# Patient Record
Sex: FEMALE | Race: OTHER | HISPANIC OR LATINO | Employment: FULL TIME | URBAN - METROPOLITAN AREA
[De-identification: names, ages, dates, MRNs, and addresses within clinical notes are randomized per-mention and may not be internally consistent; named-entity substitution may affect disease eponyms.]

---

## 2022-11-23 ENCOUNTER — APPOINTMENT (OUTPATIENT)
Dept: LAB | Age: 27
End: 2022-11-23

## 2022-11-23 ENCOUNTER — APPOINTMENT (OUTPATIENT)
Dept: URGENT CARE | Age: 27
End: 2022-11-23

## 2022-11-23 DIAGNOSIS — Z02.1 PHYSICAL EXAM, PRE-EMPLOYMENT: Primary | ICD-10-CM

## 2022-11-23 DIAGNOSIS — Z02.1 PHYSICAL EXAM, PRE-EMPLOYMENT: ICD-10-CM

## 2022-11-28 LAB
GAMMA INTERFERON BACKGROUND BLD IA-ACNC: 0.06 IU/ML
M TB IFN-G BLD-IMP: NEGATIVE
M TB IFN-G CD4+ BCKGRND COR BLD-ACNC: 0.02 IU/ML
M TB IFN-G CD4+ BCKGRND COR BLD-ACNC: 0.03 IU/ML
MITOGEN IGNF BCKGRD COR BLD-ACNC: 8.51 IU/ML

## 2023-01-20 ENCOUNTER — TELEPHONE (OUTPATIENT)
Dept: GYNECOLOGY | Facility: CLINIC | Age: 28
End: 2023-01-20

## 2023-01-24 ENCOUNTER — OFFICE VISIT (OUTPATIENT)
Dept: GYNECOLOGY | Facility: CLINIC | Age: 28
End: 2023-01-24

## 2023-01-24 ENCOUNTER — ULTRASOUND (OUTPATIENT)
Dept: GYNECOLOGY | Facility: CLINIC | Age: 28
End: 2023-01-24

## 2023-01-24 ENCOUNTER — APPOINTMENT (OUTPATIENT)
Dept: LAB | Facility: HOSPITAL | Age: 28
End: 2023-01-24

## 2023-01-24 VITALS
DIASTOLIC BLOOD PRESSURE: 80 MMHG | HEIGHT: 67 IN | BODY MASS INDEX: 30.7 KG/M2 | SYSTOLIC BLOOD PRESSURE: 124 MMHG | WEIGHT: 195.6 LBS

## 2023-01-24 DIAGNOSIS — R10.32 LLQ PAIN: Primary | ICD-10-CM

## 2023-01-24 DIAGNOSIS — Z98.891 HISTORY OF CESAREAN SECTION: ICD-10-CM

## 2023-01-24 DIAGNOSIS — Z11.3 SCREENING FOR STD (SEXUALLY TRANSMITTED DISEASE): ICD-10-CM

## 2023-01-24 DIAGNOSIS — N94.10 DYSPAREUNIA, FEMALE: ICD-10-CM

## 2023-01-24 DIAGNOSIS — R10.2 PELVIC PAIN IN FEMALE: ICD-10-CM

## 2023-01-24 DIAGNOSIS — N76.0 BV (BACTERIAL VAGINOSIS): ICD-10-CM

## 2023-01-24 DIAGNOSIS — N94.6 DYSMENORRHEA: ICD-10-CM

## 2023-01-24 DIAGNOSIS — N92.0 MENORRHAGIA WITH REGULAR CYCLE: ICD-10-CM

## 2023-01-24 DIAGNOSIS — R30.0 DYSURIA: ICD-10-CM

## 2023-01-24 DIAGNOSIS — B96.89 BV (BACTERIAL VAGINOSIS): ICD-10-CM

## 2023-01-24 DIAGNOSIS — Z30.011 ENCOUNTER FOR INITIAL PRESCRIPTION OF CONTRACEPTIVE PILLS: ICD-10-CM

## 2023-01-24 DIAGNOSIS — N83.202 LEFT OVARIAN CYST: ICD-10-CM

## 2023-01-24 LAB
B-HCG SERPL-ACNC: <1 MIU/ML (ref 0–11.6)
BACTERIA UR QL AUTO: ABNORMAL /HPF
BASOPHILS # BLD AUTO: 0.04 THOUSANDS/ÂΜL (ref 0–0.1)
BASOPHILS NFR BLD AUTO: 0 % (ref 0–1)
BILIRUB UR QL STRIP: ABNORMAL
CLARITY UR: ABNORMAL
COLOR UR: ABNORMAL
EOSINOPHIL # BLD AUTO: 0.41 THOUSAND/ÂΜL (ref 0–0.61)
EOSINOPHIL NFR BLD AUTO: 4 % (ref 0–6)
ERYTHROCYTE [DISTWIDTH] IN BLOOD BY AUTOMATED COUNT: 13.4 % (ref 11.6–15.1)
GLUCOSE UR STRIP-MCNC: NEGATIVE MG/DL
HBV SURFACE AG SER QL: NORMAL
HCT VFR BLD AUTO: 40.5 % (ref 34.8–46.1)
HCV AB SER QL: NORMAL
HGB BLD-MCNC: 12.9 G/DL (ref 11.5–15.4)
HGB UR QL STRIP.AUTO: ABNORMAL
IMM GRANULOCYTES # BLD AUTO: 0.02 THOUSAND/UL (ref 0–0.2)
IMM GRANULOCYTES NFR BLD AUTO: 0 % (ref 0–2)
KETONES UR STRIP-MCNC: NEGATIVE MG/DL
LEUKOCYTE ESTERASE UR QL STRIP: ABNORMAL
LYMPHOCYTES # BLD AUTO: 2.45 THOUSANDS/ÂΜL (ref 0.6–4.47)
LYMPHOCYTES NFR BLD AUTO: 23 % (ref 14–44)
MCH RBC QN AUTO: 26.6 PG (ref 26.8–34.3)
MCHC RBC AUTO-ENTMCNC: 31.9 G/DL (ref 31.4–37.4)
MCV RBC AUTO: 84 FL (ref 82–98)
MONOCYTES # BLD AUTO: 0.75 THOUSAND/ÂΜL (ref 0.17–1.22)
MONOCYTES NFR BLD AUTO: 7 % (ref 4–12)
MUCOUS THREADS UR QL AUTO: ABNORMAL
NEUTROPHILS # BLD AUTO: 7.1 THOUSANDS/ÂΜL (ref 1.85–7.62)
NEUTS SEG NFR BLD AUTO: 66 % (ref 43–75)
NITRITE UR QL STRIP: POSITIVE
NON-SQ EPI CELLS URNS QL MICRO: ABNORMAL /HPF
NRBC BLD AUTO-RTO: 0 /100 WBCS
PH UR STRIP.AUTO: 6 [PH]
PLATELET # BLD AUTO: 302 THOUSANDS/UL (ref 149–390)
PMV BLD AUTO: 9.6 FL (ref 8.9–12.7)
PROT UR STRIP-MCNC: ABNORMAL MG/DL
RBC # BLD AUTO: 4.85 MILLION/UL (ref 3.81–5.12)
RBC #/AREA URNS AUTO: ABNORMAL /HPF
RPR SER QL: NORMAL
SL AMB POCT URINE HCG: NORMAL
SP GR UR STRIP.AUTO: 1.01 (ref 1–1.03)
TSH SERPL DL<=0.05 MIU/L-ACNC: 1.48 UIU/ML (ref 0.45–4.5)
UROBILINOGEN UR STRIP-ACNC: 2 MG/DL
WBC # BLD AUTO: 10.77 THOUSAND/UL (ref 4.31–10.16)
WBC #/AREA URNS AUTO: ABNORMAL /HPF
WBC CLUMPS # UR AUTO: PRESENT /UL

## 2023-01-24 RX ORDER — CLINDAMYCIN HYDROCHLORIDE 300 MG/1
300 CAPSULE ORAL 2 TIMES DAILY
Qty: 14 CAPSULE | Refills: 0 | Status: SHIPPED | OUTPATIENT
Start: 2023-01-24 | End: 2023-01-31

## 2023-01-24 NOTE — PROGRESS NOTES
AMB US Pelvic Non OB    Date/Time: 1/24/2023 10:28 AM  Performed by: Kenia Demarco  Authorized by: Skyler Chambers DO   Universal Protocol:  Patient identity confirmed: verbally with patient      Procedure details:     Technique:  Transvaginal US, Non-OB    Position: lithotomy exam    Uterine findings:     Length (cm): 8 28    Height (cm):  4 05    Width (cm):  5 31    Endometrial stripe: identified      Endometrium thickness (mm):  11  Left ovary findings:     Left ovary:  Visualized    Length (cm): 3 27    Height (cm): 2 39    Width (cm): 2 55  Right ovary findings:     Right ovary:  Visualized    Length (cm): 3 16    Height (cm): 1 56    Width (cm): 1 76  Other findings:     Free pelvic fluid: not identified      Free peritoneal fluid: not identified    Post-Procedure Details:     Impression:  Anteverted uterus and right ovary appear within normal limits  The left ovary demonstrates a 1 6cm cyst with septation  A 1 7cm left paraovarian cyst is also noted  No free fluid  Tolerance: Tolerated well, no immediate complications    Complications: no complications    Additional Procedure Comments:      [employer] F8 E8C-RS transvaginal transducer Serial # [id number] was used to perform the examination today and subsequently followed with high level disinfection utilizing Trophon EPR procedure  Ultrasound performed at:     [id number] [address]  Phone:  [phone]  Fax:  [phone]

## 2023-01-24 NOTE — PROGRESS NOTES
Assessment/Plan:    Diagnoses and all orders for this visit:    Pelvic pain in female  -     Chlamydia/GC amplified DNA by PCR  -     norethindrone (AYGESTIN) 5 mg tablet; Take 1 tablet (5 mg total) by mouth daily  -     US pelvis complete w transvaginal; Future    Left ovarian cyst  -     norethindrone (AYGESTIN) 5 mg tablet; Take 1 tablet (5 mg total) by mouth daily  -     POCT urine HCG  -     US pelvis complete w transvaginal; Future    Dysmenorrhea  -     US pelvis complete w transvaginal; Future    Dyspareunia, female    Screening for STD (sexually transmitted disease)  -     Chlamydia/GC amplified DNA by PCR  -     Hepatitis B surface antigen; Future  -     RPR; Future  -     Hepatitis C antibody; Future  -     HIV-1 RNA, quantitative, PCR; Future    Encounter for initial prescription of contraceptive pills  -     norethindrone (AYGESTIN) 5 mg tablet; Take 1 tablet (5 mg total) by mouth daily    BV (bacterial vaginosis)  -     clindamycin (CLEOCIN) 300 MG capsule; Take 1 capsule (300 mg total) by mouth 2 (two) times a day for 7 days    Menorrhagia with regular cycle  -     hCG, quantitative; Future  -     TSH, 3rd generation with Free T4 reflex; Future  -     CBC and differential; Future  -     norethindrone (AYGESTIN) 5 mg tablet; Take 1 tablet (5 mg total) by mouth daily  -     POCT urine HCG  -     US pelvis complete w transvaginal; Future    Dysuria  -     clindamycin (CLEOCIN) 300 MG capsule; Take 1 capsule (300 mg total) by mouth 2 (two) times a day for 7 days  -     Cancel: UA (URINE) with reflex to Scope; Future  -     Cancel: Urine culture  -     UA (URINE) with reflex to Scope  -     Urine culture          Social history   mother of 2,   Unemployed?,  Does not use recreational drugs does not drink alcohol  Request STD testing    Subjective: pelvic pain     Patient ID: Mikki Gibson is a 34 y o  female      HPI    45-year-old female  6 para 2 042 here today with complaints of progressive pelvic pain symptoms  States her cycles are becoming increasingly more painful and heavier  Denies any irregular menstruation  Menarche was at age 10  She currently has 3 male sex partner request STD testing today she does have history of Trichomonas in the past about 6 months ago  She had 1 vaginal delivery and 1  section  She states she has have requested pelvic pain symptoms at rest and worse with her menstrual cycle and with intercourse  She is interested in contraception and agrees to start a birth control pill  FD LMP was 2023, negative UPT today, hCG ordered along with other screening laboratory studies an STD testing  Pelvic ultrasound  Normal size anteverted uterus measuring 8 28 x 4 05 x 5 31 cm  Endometrial thickness was 11 mm  Both ovaries were normal size and dimension left ovary demonstrates a 1 6 cm cyst with septation  Also identified a 1 7 left para ovarian cyst   There is no free fluid  Review of Systems   Respiratory: Negative  Cardiovascular: Negative  Gastrointestinal: Negative  Genitourinary: Positive for dyspareunia, dysuria, menstrual problem, pelvic pain and vaginal discharge  Neurological: Negative  Psychiatric/Behavioral: Negative  All other systems reviewed and are negative  Objective: no acute distress  /80 (BP Location: Left arm, Patient Position: Sitting, Cuff Size: Standard)   Ht 5' 7" (1 702 m)   Wt 88 7 kg (195 lb 9 6 oz)   LMP 2023 (Exact Date)   BMI 30 64 kg/m²      Physical Exam  Vitals reviewed  Exam conducted with a chaperone present  Constitutional:       Appearance: Normal appearance  She is obese  HENT:      Head: Normocephalic and atraumatic  Nose: Nose normal       Mouth/Throat:      Mouth: Mucous membranes are moist    Eyes:      Extraocular Movements: Extraocular movements intact        Conjunctiva/sclera: Conjunctivae normal       Pupils: Pupils are equal, round, and reactive to light    Pulmonary:      Effort: Pulmonary effort is normal       Breath sounds: Normal breath sounds  Abdominal:      General: Abdomen is flat  Palpations: Abdomen is soft  Genitourinary:     General: Normal vulva  Comments: Normal external genitalia  Normal size uterus  Normal cervix endocervical culture for did gonorrhea chlamydia  No CMT  No pelvic masses    Yellow vaginal discharge, wet mount positive for clue cells consistent with BV  Musculoskeletal:         General: Normal range of motion  Cervical back: Normal range of motion  Skin:     General: Skin is warm and dry  Neurological:      Mental Status: She is alert and oriented to person, place, and time  Psychiatric:         Mood and Affect: Mood normal          Behavior: Behavior normal          Thought Content: Thought content normal          Judgment: Judgment normal         Please note    In addition to the time spent discussing the findings and results of today's visit and exam, I spent approximately 35 minutes of face-to-face time with the patient, greater than 50% of which was spent in counseling and coordination of care for this patient

## 2023-01-25 LAB
BACTERIA UR CULT: NORMAL
C TRACH DNA SPEC QL NAA+PROBE: NEGATIVE
N GONORRHOEA DNA SPEC QL NAA+PROBE: NEGATIVE

## 2023-01-26 LAB
HIV1 RNA # SERPL NAA+PROBE: <20 COPIES/ML
HIV1 RNA SERPL NAA+PROBE-LOG#: NORMAL LOG10COPY/ML

## 2023-03-06 NOTE — PROGRESS NOTES
AMB US Pelvic Non OB    Date/Time: 3/7/2023 9:00 AM  Performed by: Sonam Greenfield  Authorized by: Luis Stanley DO   Universal Protocol:  Patient identity confirmed: verbally with patient      Procedure details:     Indications: ovarian cysts      Technique:  Transvaginal US, Non-OB    Position: lithotomy exam    Uterine findings:     Length (cm): 8 61    Height (cm):  3 85    Width (cm):  5 29    Endometrial stripe: identified      Endometrium thickness (mm):  10 7  Left ovary findings:     Left ovary:  Visualized    Length (cm): 2 23    Height (cm): 1 25    Width (cm): 1 33  Right ovary findings:     Right ovary:  Visualized    Length (cm): 2 43    Height (cm): 1 32    Width (cm): 1 39  Other findings:     Free pelvic fluid: not identified      Free peritoneal fluid: not identified    Post-Procedure Details:     Impression:  Anteverted uterus and bilateral ovaries appear within normal limits  Previously noted left ovarian cyst is not seen today  A 1 5cm left paraovarian cyst is also noted (previously 1 7cm)  No free fluid  Tolerance: Tolerated well, no immediate complications    Complications: no complications    Additional Procedure Comments:      [employer] F8 E8C-RS transvaginal transducer Serial # [id number] was used to perform the examination today and subsequently followed with high level disinfection utilizing Trophon EPR procedure  Ultrasound performed at:     [id number] [address]  Phone:  [phone]  Fax:  [phone]

## 2023-03-07 ENCOUNTER — ULTRASOUND (OUTPATIENT)
Dept: GYNECOLOGY | Facility: CLINIC | Age: 28
End: 2023-03-07

## 2023-03-07 ENCOUNTER — ANNUAL EXAM (OUTPATIENT)
Dept: GYNECOLOGY | Facility: CLINIC | Age: 28
End: 2023-03-07

## 2023-03-07 VITALS
SYSTOLIC BLOOD PRESSURE: 108 MMHG | DIASTOLIC BLOOD PRESSURE: 76 MMHG | WEIGHT: 201 LBS | HEIGHT: 67 IN | BODY MASS INDEX: 31.55 KG/M2

## 2023-03-07 DIAGNOSIS — Z01.419 WOMEN'S ANNUAL ROUTINE GYNECOLOGICAL EXAMINATION: Primary | ICD-10-CM

## 2023-03-07 DIAGNOSIS — N83.202 LEFT OVARIAN CYST: Primary | ICD-10-CM

## 2023-03-07 DIAGNOSIS — R10.2 PELVIC PAIN IN FEMALE: ICD-10-CM

## 2023-03-07 DIAGNOSIS — Z87.42 HISTORY OF OVARIAN CYST: ICD-10-CM

## 2023-03-07 DIAGNOSIS — N92.1 BREAKTHROUGH BLEEDING ON BIRTH CONTROL PILLS: ICD-10-CM

## 2023-03-07 DIAGNOSIS — Z98.890 HISTORY OF ABDOMINOPLASTY: ICD-10-CM

## 2023-03-07 DIAGNOSIS — Z98.891 HISTORY OF CESAREAN SECTION: ICD-10-CM

## 2023-03-07 NOTE — PROGRESS NOTES
Assessment   Annual well woman exam  Pelvic pain  History of ovarian cyst  History of  section x1  Successful   Status post abdominoplasty  Breakthrough bleeding        Plan   Continue current medication, norethindrone 5 mg daily  Follow-up in 2 months and as needed   All questions answered  Await pap smear results  Subjective here for follow-up and annual exam     Vandana Solis is a 34 y o  female who presents for annual exam  Periods are irregular, lasting 1 days  Having some spotting issues  Is not taking her medication consistently  Recommend she take at bedtime ovarian cyst has resolved  Pain symptoms have improved continue current regimen  Dysmenorrhea:mild, occurring throughout menses  Cyclic symptoms include pelvic pain  No intermenstrual bleeding, spotting, or discharge  The patient reports that there is not domestic violence in her life  Current contraception: oral progesterone-only contraceptive  History of abnormal Pap smear: no  Family history of uterine or ovarian cancer: no  Regular self breast exam: yes  History of abnormal mammogram: no  Family history of breast cancer: no  History of abnormal lipids: no  Menstrual History:  OB History        6    Para   2    Term                AB        Living   2       SAB        IAB        Ectopic        Multiple        Live Births                    Menarche age: 17  Patient's last menstrual period was 2023 (exact date)  Review of Systems  Pertinent items are noted in HPI        Objective no acute distress     /76 (BP Location: Right arm, Patient Position: Sitting, Cuff Size: Standard)   Ht 5' 7" (1 702 m)   Wt 91 2 kg (201 lb)   LMP 2023 (Exact Date) Comment: patient is taking northendrone  BMI 31 48 kg/m²     General:   alert and oriented, in no acute distress, alert, appears stated age and cooperative   Heart: regular rate and rhythm, S1, S2 normal, no murmur, click, rub or gallop Lungs: clear to auscultation bilaterally   Abdomen: soft, non-tender, without masses or organomegaly   Vulva: normal, female escutcheon   Vagina: normal mucosa, normal discharge   Cervix: anteverted, multiparous appearance, no bleeding following Pap, no cervical motion tenderness and no lesions   Uterus: normal size, anteverted, mobile, non-tender, normal shape and consistency   Adnexa: normal adnexa and no mass, fullness, tenderness   Bilateral breast exam in the sitting and supine position with chaperone present, no visible or palpable breast lesions identified  No breast masses noted  No supraclavicular or axillary lymphadenopathy noted  No nipple discharge  Reviewed self-breast exam techniques     Rectal exam,  deferred

## 2023-03-16 LAB
LAB AP GYN PRIMARY INTERPRETATION: NORMAL
LAB AP LMP: NORMAL
Lab: NORMAL

## 2023-03-30 ENCOUNTER — TELEPHONE (OUTPATIENT)
Dept: GYNECOLOGY | Facility: CLINIC | Age: 28
End: 2023-03-30

## 2023-03-30 NOTE — TELEPHONE ENCOUNTER
Patient called to report she stopped taking Norethindrone due to daily spotting and bleeding  Now she has pelvic pain again which she did not have when she was taking Norethindrone  Did not answer return call

## 2023-03-31 NOTE — TELEPHONE ENCOUNTER
Patient informed  She states she has now been 10 days without taking Norethindrone  She has been sexually active without using any birth control  She is not having any bleeding or spotting now but still has some pelvic pain  Questions if she should restart the Rx now  Advised wait until cycle starts, check HPT, then can restart